# Patient Record
Sex: MALE | Race: OTHER
[De-identification: names, ages, dates, MRNs, and addresses within clinical notes are randomized per-mention and may not be internally consistent; named-entity substitution may affect disease eponyms.]

---

## 2017-11-15 ENCOUNTER — APPOINTMENT (OUTPATIENT)
Dept: HUMAN REPRODUCTION | Facility: CLINIC | Age: 37
End: 2017-11-15
Payer: COMMERCIAL

## 2017-11-15 PROBLEM — Z00.00 ENCOUNTER FOR PREVENTIVE HEALTH EXAMINATION: Status: ACTIVE | Noted: 2017-11-15

## 2017-11-15 PROCEDURE — 89322 SEMEN ANAL STRICT CRITERIA: CPT

## 2020-07-13 ENCOUNTER — EMERGENCY (EMERGENCY)
Facility: HOSPITAL | Age: 40
LOS: 1 days | Discharge: HOME | End: 2020-07-13
Attending: EMERGENCY MEDICINE
Payer: COMMERCIAL

## 2020-07-13 VITALS
HEART RATE: 62 BPM | DIASTOLIC BLOOD PRESSURE: 70 MMHG | SYSTOLIC BLOOD PRESSURE: 115 MMHG | HEIGHT: 70 IN | OXYGEN SATURATION: 97 % | RESPIRATION RATE: 18 BRPM | WEIGHT: 164.91 LBS | TEMPERATURE: 98 F

## 2020-07-13 PROCEDURE — 99284 EMERGENCY DEPT VISIT MOD MDM: CPT

## 2020-07-13 PROCEDURE — 99283 EMERGENCY DEPT VISIT LOW MDM: CPT

## 2020-07-13 RX ADMIN — Medication 1 TABLET(S): at 22:47

## 2020-07-13 NOTE — ED ADULT TRIAGE NOTE - CHIEF COMPLAINT QUOTE
I got bite by a dog went to urgent care got a tetanus shot. urgent care told me to get a rabies shot

## 2020-07-13 NOTE — ED PROVIDER NOTE - CLINICAL SUMMARY MEDICAL DECISION MAKING FREE TEXT BOX
augmentin dose will be given in the ed, wound care explained to pt, signs of infection discussed with pt, lengthy discussion about rabies vaccination with the patient, not indicated at this time, pt explained d/c instructions

## 2020-07-13 NOTE — ED PROVIDER NOTE - PATIENT PORTAL LINK FT
You can access the FollowMyHealth Patient Portal offered by [facility] by registering at the following website: [email or web address]. By joining [employer]’s FollowMyHealth portal, you will also be able to view your health information using other applications (apps) compatible with our system.

## 2020-07-13 NOTE — ED PROVIDER NOTE - ATTENDING CONTRIBUTION TO CARE
40 year old male no PMx c/o dog bite of left leg today in [facility]; patient visited urgent care center where he received abx however told to come to ED for rabies shot. PE: NAD, left lower medial mid leg 2 x 2 cm superficial wound, no bleeding. I&P: dog bite of left leg, abx, no rabies prophylaxis indicated at this time

## 2020-07-13 NOTE — ED PROVIDER NOTE - OBJECTIVE STATEMENT
pt is a 41 y/o male presenting to the ed for animal bite. pt states he was walking when three pitbulls ran out, bit him on his left lower leg. pt states went to urgent care was given tetanus shot, [facility]. pt was sent to the ed for possible rabies shot. pt denies cp, sob, headache, neck pain, visual changes, abd pain, nausea, vomiting, numbness or loss of sensation

## 2020-07-13 NOTE — ED PROVIDER NOTE - PHYSICAL EXAMINATION
Const: Awake, alert and oriented. In no acute distress. Well appearing.  HEENT: NC/AT. Moist mucous membranes.  Eyes: No scleral icterus. EOMI.  Neck:. Soft and supple. Full ROM without pain.  Cardiac: +S1/S2. No murmurs. Peripheral pulses 2+ and symmetric. No LE edema.  Resp: Speaking in full sentences. No evidence of respiratory distress. No wheezes, rales or rhonchi.  Abd: Soft, non-tender, non-distended. Normal bowel sounds in all 4 quadrants. No guarding or rebound.  MSK: FROM in all extremities, neurovasculary intact, DP palpable   Back: Spine midline and non-tender. No CVAT.  Skin: left lower leg animal bite 2.0 cm, no tendon involvement   Lymph: No cervical lymphadenopathy.  Neuro: Awake, alert & oriented x 3. Moves all extremities symmetrically.

## 2020-07-21 ENCOUNTER — EMERGENCY (EMERGENCY)
Facility: HOSPITAL | Age: 40
LOS: 0 days | Discharge: HOME | End: 2020-07-21
Attending: EMERGENCY MEDICINE
Payer: COMMERCIAL

## 2020-07-21 VITALS
RESPIRATION RATE: 18 BRPM | HEART RATE: 66 BPM | TEMPERATURE: 98 F | SYSTOLIC BLOOD PRESSURE: 144 MMHG | DIASTOLIC BLOOD PRESSURE: 75 MMHG | OXYGEN SATURATION: 100 %

## 2020-07-21 VITALS — HEIGHT: 70 IN | WEIGHT: 164.91 LBS

## 2020-07-21 DIAGNOSIS — Z20.3 CONTACT WITH AND (SUSPECTED) EXPOSURE TO RABIES: ICD-10-CM
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
## 2020-07-21 DIAGNOSIS — Z79.2 LONG TERM (CURRENT) USE OF ANTIBIOTICS: ICD-10-CM

## 2020-07-21 DIAGNOSIS — S81.852A OPEN BITE, LEFT LOWER LEG, INITIAL ENCOUNTER: ICD-10-CM
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
## 2020-07-21 DIAGNOSIS — Z91.013 ALLERGY TO SEAFOOD: ICD-10-CM
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
## 2020-07-21 PROCEDURE — 99283 EMERGENCY DEPT VISIT LOW MDM: CPT | Mod: 25

## 2020-07-21 PROCEDURE — 99283 EMERGENCY DEPT VISIT LOW MDM: CPT

## 2020-07-21 PROCEDURE — 90675 RABIES VACCINE IM: CPT

## 2020-07-21 PROCEDURE — 90471 IMMUNIZATION ADMIN: CPT | Mod: XU

## 2020-07-21 PROCEDURE — 90375 RABIES IG IM/SC: CPT

## 2020-07-21 RX ORDER — RABIES VACC, HUMAN DIPLOID/PF 2.5 UNIT
1 VIAL (EA) INTRAMUSCULAR ONCE
Refills: 0 | Status: COMPLETED | OUTPATIENT
Start: 2020-07-21 | End: 2020-07-21

## 2020-07-21 RX ORDER — HUMAN RABIES VIRUS IMMUNE GLOBULIN 150 [IU]/ML
1500 INJECTION, SOLUTION INTRAMUSCULAR ONCE
Refills: 0 | Status: DISCONTINUED | OUTPATIENT
Start: 2020-07-21 | End: 2020-07-21

## 2020-07-21 RX ORDER — HUMAN RABIES VIRUS IMMUNE GLOBULIN 150 [IU]/ML
1500 INJECTION, SOLUTION INTRAMUSCULAR ONCE
Refills: 0 | Status: COMPLETED | OUTPATIENT
Start: 2020-07-21 | End: 2020-07-21

## 2020-07-21 RX ADMIN — HUMAN RABIES VIRUS IMMUNE GLOBULIN 1500 UNIT(S): 150 INJECTION, SOLUTION INTRAMUSCULAR at 20:08

## 2020-07-21 RX ADMIN — Medication 1 MILLILITER(S): at 19:48

## 2020-07-21 NOTE — ED STATDOCS - CONDITION AT DISCHARGE:
Last office visit   3/9/17    Next office visit:  5/4/17   Surgery:  10/16/16   UDS:  No     Date last filled: Gabapentin 300 MG dated 3/9/17-4/7/17  Refill for Gabapentin 300 MG dated 4/17/17-5/16/16  Date last filled:  Oxycodone SR 15 MG dated 3/19/17-4/17/17  Refill for Oxycodone SR 15 MG dated 4/18/17-5/17/17  Date last filled:  Oxycodone IR 5 MG dated 3/9/17-3/30/17  Refill for Oxycodone IR 5 MG dated 4/17/17-5/8/17    [facility]; therapy appropriate, no aberrant behavior identified, prescription processed for physician review and signature.    A refill policy & schedule is included in the envelope with every refill that is either picked up by patient or mailed to patients home. If patient elects to have Rx mailed directly to pharmacy refill policy/schedule will be mailed to patient's home address on file.                Satisfactory

## 2020-07-21 NOTE — ED ADULT TRIAGE NOTE - CHIEF COMPLAINT QUOTE
Pt comes in requesting rabies vaccine. Pt was bit by a dog last week, received the tetanus shot but now needs rabies vaccine because unable to find dogs vaccinations.

## 2020-07-21 NOTE — ED STATDOCS - PROGRESS NOTE DETAILS
Attending Betzaida,[phone] called for animal control Dr. Huston spoke with rabies nurse who did recommend patient get RIG and vaccine.  Patient given both here, 3cc of RIG given around bite wound, 2cc in left glute. vaccine given in right deltoid. Patient made aware to return on day 3, 7 and 14 for repeat rabies vaccine -Jason Faust PA-C

## 2020-07-21 NOTE — ED STATDOCS - MUSCULOSKELETAL, MLM
range of motion is not limited and there is no muscle tenderness. range of motion is not limited and there is no muscle tenderness.  Distal N/V/M intact.

## 2020-07-21 NOTE — ED STATDOCS - SKIN, MLM
skin normal color for race, warm, dry and intact. +bite wound to left lateral calf 2x3 cm fibrinous drainage, not red, not hot, not fluctuant skin normal color for race, warm, dry. +bite wound to left lateral thigh 2x3 cm with some fibrinous drainage, not red, not hot, not fluctuant

## 2020-07-21 NOTE — ED STATDOCS - CLINICAL SUMMARY MEDICAL DECISION MAKING FREE TEXT BOX
pt s/p dog bite x1 week ago unable to locate dog and owner. Animal control instructed pt to come in for rabies vaccine.

## 2020-07-21 NOTE — ED STATDOCS - OBJECTIVE STATEMENT
41 y/o M presents to ED for rabies shot. Pt got bit by a pit bull last week on left lateral calf, received a tetanus shot last week at [facility]. Pt reports he was unable to obtain the dog's vaccination records and received a call today from animal control to get rabies shot. Was placed on Augmentin last week. Reports mild diarrhea but denies any other sx currently.

## 2020-07-21 NOTE — ED STATDOCS - NSFOLLOWUPINSTRUCTIONS_ED_ALL_ED_FT
RETURN TO THE ER TO CONTINUE RABIES SERIES ON DAY 3, 7 AND 14.  TODAY IS CONSIDERED DAY 0.  RETURN ON 7/24, 7/28, 8/4 FOR REPEAT VACCINE.    Animal Bite    WHAT YOU NEED TO KNOW:    Animal bite injuries range from shallow cuts to deep, life-threatening wounds. An animal can cut or puncture the skin when it bites. Your skin may be torn from your body. Your skin may swell or bruise even if the bite does not break the skin. Animal bites occur more often on the hands, arms, legs, and face. Bites from dogs and cats are the most common injuries.    DISCHARGE INSTRUCTIONS:    Return to the emergency department if:     You have a fever.      Your wound is red, swollen, and draining pus.      You see red streaks on the skin around the wound.      You can no longer move the bitten area.      Your heartbeat and breathing are much faster than usual.      You feel dizzy and confused.    Contact your healthcare provider if:     Your pain does not get better, even after you take pain medicine.      You have nightmares or flashbacks about the animal bite.       You have questions or concerns about your condition or care.    Medicines: You may need any of the following:     Antibiotics prevent or treat a bacterial infection.      Prescription pain medicine may be given. Ask how to take this medicine safely.      A tetanus vaccine may be needed to prevent tetanus. Tetanus is a life-threatening bacterial infection that affects the nerves and muscles. The bacteria can be spread through animal bites.       A rabies vaccine may be needed to prevent rabies. Rabies is a life-threatening viral infection. The virus can be spread through animal bites.      Take your medicine as directed. Contact your healthcare provider if you think your medicine is not helping or if you have side effects. Tell him of her if you are allergic to any medicine. Keep a list of the medicines, vitamins, and herbs you take. Include the amounts, and when and why you take them. Bring the list or the pill bottles to follow-up visits. Carry your medicine list with you in case of an emergency.    Follow up with your healthcare provider in 1 to 2 days: You may need to return to have your stitches removed. Write down your questions so you remember to ask them during your visits.    Self-care:     Apply antibiotic ointment as directed. This helps prevent infection in minor skin wounds. It is available without a doctor's order.      Keep the wound clean and covered. Wash the wound every day with soap and water or germ-killing cleanser. Ask your healthcare provider about the kinds of bandages to use.            Apply ice on your wound. Ice helps decrease swelling and pain. Ice may also help prevent tissue damage. Use an ice pack, or put crushed ice in a plastic bag. Cover it with a towel and place it on your wound for 15 to 20 minutes every hour or as directed.      Elevate the wound area. Raise your wound above the level of your heart as often as you can. This will help decrease swelling and pain. Prop your wound on pillows or blankets to keep it elevated comfortably.     Prevent another animal bite:     Learn to recognize the signs of a scared or angry pet. Avoid quick, sudden movements.      Do not step between animals that are fighting.      Do not leave a pet alone with a young child.      Do not disturb an animal while it eats, sleeps, or cares for its young.      Do not approach an animal you do not know, especially one that is tied up or caged.      Stay away from animals that seem sick or act strangely.      Do not feed or capture wild animals.

## 2020-07-21 NOTE — ED STATDOCS - ATTENDING CONTRIBUTION TO CARE
I, Kole Huston MD,  performed the initial face to face bedside interview with this patient regarding history of present illness, review of symptoms and relevant past medical, social and family history.  I completed an independent physical examination.  I was the initial provider who evaluated this patient. I have signed out the follow up of any pending tests (i.e. labs, radiological studies) to the ACP.  I have communicated the patient’s plan of care and disposition with the ACP.  The history, relevant review of systems, past medical and surgical history, medical decision making, and physical examination was documented by the scribe in my presence and I attest to the accuracy of the documentation.

## 2020-07-21 NOTE — ED STATDOCS - PATIENT PORTAL LINK FT
You can access the FollowMyHealth Patient Portal offered by [facility] by registering at the following website: [email or web address]. By joining [employer]’s FollowMyHealth portal, you will also be able to view your health information using other applications (apps) compatible with our system.

## 2020-07-22 PROBLEM — Z78.9 OTHER SPECIFIED HEALTH STATUS: Chronic | Status: ACTIVE | Noted: 2020-07-13

## 2023-05-23 NOTE — ED ADULT TRIAGE NOTE - WEIGHT IN LBS
164.9 At the time of discharge, patient denied SIIP. Patient plans to start outpatient therapy to develop more coping skills. At the time of discharge, patient denied SIIP. Patient plans to start outpatient therapy to develop more coping skills. See risk assessment in hospital course
